# Patient Record
Sex: FEMALE | Race: WHITE | Employment: OTHER | URBAN - METROPOLITAN AREA
[De-identification: names, ages, dates, MRNs, and addresses within clinical notes are randomized per-mention and may not be internally consistent; named-entity substitution may affect disease eponyms.]

---

## 2017-01-20 ENCOUNTER — HOSPITAL ENCOUNTER (OUTPATIENT)
Dept: NUCLEAR MEDICINE | Facility: HOSPITAL | Age: 82
Discharge: HOME | End: 2017-01-20
Attending: UROLOGY
Payer: MEDICARE

## 2017-01-20 DIAGNOSIS — N13.30 HYDRONEPHROSIS, UNSPECIFIED HYDRONEPHROSIS TYPE: ICD-10-CM

## 2017-01-20 PROCEDURE — 78708 K FLOW/FUNCT IMAGE W/DRUG: CPT

## 2017-01-20 RX ORDER — FUROSEMIDE 10 MG/ML
40 INJECTION INTRAMUSCULAR; INTRAVENOUS ONCE
Status: COMPLETED | OUTPATIENT
Start: 2017-01-20 | End: 2017-01-20

## 2017-01-20 RX ORDER — FUROSEMIDE 10 MG/ML
20 INJECTION INTRAMUSCULAR; INTRAVENOUS ONCE
Status: DISCONTINUED | OUTPATIENT
Start: 2017-01-20 | End: 2017-01-24

## 2017-01-20 RX ADMIN — FUROSEMIDE 40 MG: 10 INJECTION INTRAMUSCULAR; INTRAVENOUS at 13:40:00

## 2017-01-26 ENCOUNTER — TELEPHONE (OUTPATIENT)
Dept: SURGERY | Facility: CLINIC | Age: 82
End: 2017-01-26

## 2017-01-26 DIAGNOSIS — N13.5 UPJ OBSTRUCTION, ACQUIRED: Primary | ICD-10-CM

## 2017-01-26 NOTE — TELEPHONE ENCOUNTER
Pt requesting lab/test results. Please call, thank you and requesting results to be mailed to place of residence.

## 2017-01-27 NOTE — TELEPHONE ENCOUNTER
, please see pt's request below and advise. thank you. ( please note : pt had blood tests and renogram)

## 2017-01-28 NOTE — TELEPHONE ENCOUNTER
Please call patient.   Lasix renogram shows obstruction of right kidney consistent with a disorder called \"ureteropelvic junction obstruction\",== in many patients, it is a subtle birth defects of the urinary tract where there is a pinched between the omid

## 2017-01-31 NOTE — TELEPHONE ENCOUNTER
Phoned pt again, and again, received voice mail. Lmtcb. Clinic call back number provided. Phoned alternative number and son Darlene Whiteside answered. I was unable to locate and R.O.I. In chart, however Darlene Whiteside states he would prefer I speak with his mother.  Informed Dg

## 2017-01-31 NOTE — TELEPHONE ENCOUNTER
Phoned pt, however received voice mail. [facility] call back number provided. Routed to [address] [place] [address] . (called and confirmed  with her office, that she can receive from Epic.)

## 2017-02-01 NOTE — TELEPHONE ENCOUNTER
pt returned your call,  She has a Dr's appt  today. She asked if you could please call her after 3pm.  She also asked if you could mail the test results. Thank you.

## 2017-02-01 NOTE — TELEPHONE ENCOUNTER
Pt returning Rn's call, and requesting lab results to be mailed out to place of residence. Please call, thank you.  Please call home [phone]

## 2017-02-03 ENCOUNTER — TELEPHONE (OUTPATIENT)
Dept: SURGERY | Facility: CLINIC | Age: 82
End: 2017-02-03

## 2017-02-03 NOTE — TELEPHONE ENCOUNTER
Patient requesting a prior authorization for a CT SCAN. States the number to call is [phone]. Please call patient once authorized. Thank you.

## 2017-02-07 ENCOUNTER — TELEPHONE (OUTPATIENT)
Dept: SURGERY | Facility: CLINIC | Age: 82
End: 2017-02-07

## 2017-02-07 NOTE — TELEPHONE ENCOUNTER
pt called. PVK advised to call once an appt was made. CT is scheduled for 2/16/17 at the [address] [place] [address] location. She is also to make an appt for f/up of test results. No Sooner availability. Please advise.

## 2017-02-07 NOTE — TELEPHONE ENCOUNTER
Called [phone]. Spoke with Laurie who transferred me to [facility], reference # [id number]. Transferred to [facility]. Authorization # [id number], from 02/07/2017 through 03/09/2017.  Fax confirmation of this information received from [facility] and

## 2017-02-15 NOTE — TELEPHONE ENCOUNTER
Phoned pt and received voice mail. LMTCB. Clinic call back number provided. ARACELI, IF PT RETURNS CALL, please offer 2/21st , at 1:40, in an injection slot for f/u on c.t. Results. IF PT ACCEPTS, PLEASE SLOT APPT accordingly. Thank you.

## 2017-02-16 ENCOUNTER — HOSPITAL ENCOUNTER (OUTPATIENT)
Dept: CT IMAGING | Age: 82
Discharge: HOME | End: 2017-02-16
Attending: UROLOGY
Payer: MEDICARE

## 2017-02-16 DIAGNOSIS — N13.5 UPJ OBSTRUCTION, ACQUIRED: ICD-10-CM

## 2017-02-16 LAB — CREAT BLD-MCNC: 0.8 MG/DL (ref 0.5–1.5)

## 2017-02-16 PROCEDURE — 82565 ASSAY OF CREATININE: CPT

## 2017-02-16 PROCEDURE — 74177 CT ABD & PELVIS W/CONTRAST: CPT

## 2017-02-21 ENCOUNTER — OFFICE VISIT (OUTPATIENT)
Dept: SURGERY | Facility: CLINIC | Age: 82
End: 2017-02-21

## 2017-02-21 ENCOUNTER — TELEPHONE (OUTPATIENT)
Dept: SURGERY | Facility: CLINIC | Age: 82
End: 2017-02-21

## 2017-02-21 VITALS
RESPIRATION RATE: 16 BRPM | HEART RATE: 76 BPM | BODY MASS INDEX: 20.61 KG/M2 | SYSTOLIC BLOOD PRESSURE: 144 MMHG | TEMPERATURE: 99 F | DIASTOLIC BLOOD PRESSURE: 70 MMHG | HEIGHT: 62 IN | WEIGHT: 112 LBS

## 2017-02-21 DIAGNOSIS — N28.1 RENAL CYST: ICD-10-CM

## 2017-02-21 DIAGNOSIS — N13.5 UPJ OBSTRUCTION, ACQUIRED: ICD-10-CM

## 2017-02-21 DIAGNOSIS — R91.8 ABNORMAL CT SCAN, LUNG: ICD-10-CM

## 2017-02-21 DIAGNOSIS — N13.30 HYDRONEPHROSIS, UNSPECIFIED HYDRONEPHROSIS TYPE: Primary | ICD-10-CM

## 2017-02-21 DIAGNOSIS — N81.4 CERVICAL PROLAPSE: ICD-10-CM

## 2017-02-21 PROCEDURE — 99215 OFFICE O/P EST HI 40 MIN: CPT | Performed by: UROLOGY

## 2017-02-21 PROCEDURE — G0463 HOSPITAL OUTPT CLINIC VISIT: HCPCS | Performed by: UROLOGY

## 2017-02-21 NOTE — PATIENT INSTRUCTIONS
1.  Please restrict fluids for 3 hours or bedtime. Take nighttime pills with only 4-6 ounces of water instead of 8 hours    2.   Please discuss with Dr. Tamar Weaver as to whether or not it would be wise to see a gynecologist for your uterine and bladder prolap

## 2017-02-21 NOTE — TELEPHONE ENCOUNTER
Lukasz Maxwell saw Anila Chamorro in the office September 2016; CT of the chest at the time suggested Kwell of aspiration pneumonia from May 2016. I just had patient get a CT abdomen and pelvis for workup of right kidney UPJ obstruction--radiologist reporting aspiration pneumonia or its sequelae in the base of the lungs--is this a chronic permanent appearance or could represent recurrent aspiration? Should patient see you in the office? They will probably be calling your nurse concerning this matter. Patient is a patient of Dr. Porter Springer.   Many thanks, Jose Lund

## 2017-02-21 NOTE — PROGRESS NOTES
HPI:    Patient ID: Hao Benitez is a 80year old female. HPI     Hx given by pt and son Andrews Kim. 1. UPJ obstruction/hydronephrosis   Denies flank pain, other associated pain or hx of recurrent infections. She feels this condition is stable.  Onset: not need to change the bulge when she has a bowel movement. She feels this condition is stable. Review of previous records: Patient was referred by Dr. Sudhir Fenton.   For workup of chronic severe back pain, MRI of the spine in January 2016, first identified and hematuria (gross). Musculoskeletal: Positive for back pain.         Negative for flank pain       HISTORY:  Past Medical History   Diagnosis Date   • Uterine prolapse    • Anxiety    • Allergic rhinitis    • Osteoarthritis    • Essential hypertension PHYSICAL EXAM:   Physical Exam   Constitutional: She appears well-developed and well-nourished. No distress. HENT:   Head: Normocephalic and atraumatic. Eyes: EOM are normal.   Neck: Normal range of motion.    Pulmonary/Chest: Effort normal. No respir degeneration. L2-L3 significant disc degeneration. L3-L4 disc bulge/slight. L4-L5 diffuse disc bulge. L5-S1 disc degeneration.       4/22/2016 renal ultrasound [facility] = marked right pelvicalyceal dilation with normal ureter consistent with chronic UPJ obst prolapse  9/28/16 [facility] consult: mild cystocele grade 2-3, approaching introitus; rectocele grade 1; mild hypermobility of bladder neck; no leakage of urine with coughing in dorsal lithotomy; significant descensus of the cervix and uterus.  The pt would like applicable) and agree that the record reflects my personal performance and is accurate and complete.   Markel Flores MD, 2/24/2017, 3:37 PM

## 2017-02-22 ENCOUNTER — TELEPHONE (OUTPATIENT)
Dept: PULMONOLOGY | Facility: CLINIC | Age: 82
End: 2017-02-22

## 2017-02-22 NOTE — TELEPHONE ENCOUNTER
Pt requests to know if she needs f/u appt w/ MD. She stts Dr. Gloria Cloud brought up aspiration & now she is afraid to eat. Pt stts CT Abd & Pelvis done recently & showed lung nodule, but it has been present since at least 10/2015.  She stts Dr. Gloria Cloud was

## 2017-02-24 NOTE — TELEPHONE ENCOUNTER
Notified pt of Dr. Palafox Amend orders. Explained msgs were relayed to MD. Encouraged her to discuss any concerns re: aspiration & reflux w/ PCP. She verbalized understanding.

## 2017-04-22 ENCOUNTER — LAB ENCOUNTER (OUTPATIENT)
Dept: LAB | Age: 82
End: 2017-04-22
Attending: INTERNAL MEDICINE
Payer: MEDICARE

## 2017-04-22 DIAGNOSIS — F41.1 GENERALIZED ANXIETY DISORDER: ICD-10-CM

## 2017-04-22 DIAGNOSIS — G47.00 INSOMNIA, UNSPECIFIED TYPE: ICD-10-CM

## 2017-04-22 PROCEDURE — 80053 COMPREHEN METABOLIC PANEL: CPT

## 2017-04-22 PROCEDURE — 36415 COLL VENOUS BLD VENIPUNCTURE: CPT

## 2017-04-22 PROCEDURE — 84443 ASSAY THYROID STIM HORMONE: CPT

## 2017-04-22 PROCEDURE — 85025 COMPLETE CBC W/AUTO DIFF WBC: CPT

## 2017-05-01 PROBLEM — F41.1 GENERALIZED ANXIETY DISORDER: Status: ACTIVE | Noted: 2017-05-01

## 2017-07-28 RX ORDER — ATORVASTATIN CALCIUM 40 MG/1
TABLET, FILM COATED ORAL
Qty: 30 TABLET | OUTPATIENT
Start: 2017-07-28

## 2017-07-28 RX ORDER — CANDESARTAN 16 MG/1
TABLET ORAL
Qty: 60 TABLET | OUTPATIENT
Start: 2017-07-28

## 2017-08-10 ENCOUNTER — APPOINTMENT (OUTPATIENT)
Dept: GENERAL RADIOLOGY | Facility: HOSPITAL | Age: 82
End: 2017-08-10
Attending: EMERGENCY MEDICINE
Payer: MEDICARE

## 2017-08-10 ENCOUNTER — HOSPITAL ENCOUNTER (OUTPATIENT)
Facility: HOSPITAL | Age: 82
Setting detail: OBSERVATION
Discharge: HOME | End: 2017-08-11
Attending: EMERGENCY MEDICINE | Admitting: INTERNAL MEDICINE
Payer: MEDICARE

## 2017-08-10 ENCOUNTER — APPOINTMENT (OUTPATIENT)
Dept: CT IMAGING | Facility: HOSPITAL | Age: 82
End: 2017-08-10
Attending: EMERGENCY MEDICINE
Payer: MEDICARE

## 2017-08-10 DIAGNOSIS — R42 VERTIGO: ICD-10-CM

## 2017-08-10 DIAGNOSIS — I16.0 HYPERTENSIVE URGENCY: Primary | ICD-10-CM

## 2017-08-10 PROBLEM — R73.9 HYPERGLYCEMIA: Status: ACTIVE | Noted: 2017-08-10

## 2017-08-10 PROBLEM — E87.1 HYPONATREMIA: Status: ACTIVE | Noted: 2017-08-10

## 2017-08-10 PROBLEM — R79.89 AZOTEMIA: Status: ACTIVE | Noted: 2017-08-10

## 2017-08-10 LAB
ANION GAP SERPL CALC-SCNC: 10 MMOL/L (ref 0–18)
BASOPHILS # BLD: 0 K/UL (ref 0–0.2)
BASOPHILS NFR BLD: 0 %
BUN SERPL-MCNC: 15 MG/DL (ref 8–20)
BUN/CREAT SERPL: 23.8 (ref 10–20)
CALCIUM SERPL-MCNC: 10.5 MG/DL (ref 8.5–10.5)
CHLORIDE SERPL-SCNC: 93 MMOL/L (ref 95–110)
CO2 SERPL-SCNC: 28 MMOL/L (ref 22–32)
CREAT SERPL-MCNC: 0.63 MG/DL (ref 0.5–1.5)
EOSINOPHIL # BLD: 0.1 K/UL (ref 0–0.7)
EOSINOPHIL NFR BLD: 1 %
ERYTHROCYTE [DISTWIDTH] IN BLOOD BY AUTOMATED COUNT: 16.6 % (ref 11–15)
GLUCOSE SERPL-MCNC: 138 MG/DL (ref 70–99)
HCT VFR BLD AUTO: 36.8 % (ref 35–48)
HGB BLD-MCNC: 12.3 G/DL (ref 12–16)
LYMPHOCYTES # BLD: 1.3 K/UL (ref 1–4)
LYMPHOCYTES NFR BLD: 14 %
MCH RBC QN AUTO: 24.4 PG (ref 27–32)
MCHC RBC AUTO-ENTMCNC: 33.4 G/DL (ref 32–37)
MCV RBC AUTO: 73 FL (ref 80–100)
MONOCYTES # BLD: 0.6 K/UL (ref 0–1)
MONOCYTES NFR BLD: 7 %
NEUTROPHILS # BLD AUTO: 7.3 K/UL (ref 1.8–7.7)
NEUTROPHILS NFR BLD: 78 %
OSMOLALITY UR CALC.SUM OF ELEC: 275 MOSM/KG (ref 275–295)
PLATELET # BLD AUTO: 235 K/UL (ref 140–400)
PMV BLD AUTO: 8.1 FL (ref 7.4–10.3)
POTASSIUM SERPL-SCNC: 3.7 MMOL/L (ref 3.3–5.1)
RBC # BLD AUTO: 5.05 M/UL (ref 3.7–5.4)
SODIUM SERPL-SCNC: 131 MMOL/L (ref 136–144)
WBC # BLD AUTO: 9.3 K/UL (ref 4–11)

## 2017-08-10 PROCEDURE — 93005 ELECTROCARDIOGRAM TRACING: CPT

## 2017-08-10 PROCEDURE — 96375 TX/PRO/DX INJ NEW DRUG ADDON: CPT

## 2017-08-10 PROCEDURE — 96374 THER/PROPH/DIAG INJ IV PUSH: CPT

## 2017-08-10 PROCEDURE — 99285 EMERGENCY DEPT VISIT HI MDM: CPT

## 2017-08-10 PROCEDURE — 70450 CT HEAD/BRAIN W/O DYE: CPT | Performed by: EMERGENCY MEDICINE

## 2017-08-10 PROCEDURE — 85025 COMPLETE CBC W/AUTO DIFF WBC: CPT | Performed by: EMERGENCY MEDICINE

## 2017-08-10 PROCEDURE — 80048 BASIC METABOLIC PNL TOTAL CA: CPT | Performed by: EMERGENCY MEDICINE

## 2017-08-10 PROCEDURE — 71010 XR CHEST AP PORTABLE  (CPT=71010): CPT | Performed by: EMERGENCY MEDICINE

## 2017-08-10 PROCEDURE — 93010 ELECTROCARDIOGRAM REPORT: CPT | Performed by: EMERGENCY MEDICINE

## 2017-08-10 RX ORDER — TRAZODONE HYDROCHLORIDE 50 MG/1
25 TABLET ORAL NIGHTLY
Status: DISCONTINUED | OUTPATIENT
Start: 2017-08-10 | End: 2017-08-11

## 2017-08-10 RX ORDER — ASPIRIN 81 MG/1
81 TABLET, CHEWABLE ORAL DAILY
Status: DISCONTINUED | OUTPATIENT
Start: 2017-08-11 | End: 2017-08-11

## 2017-08-10 RX ORDER — HYDRALAZINE HYDROCHLORIDE 20 MG/ML
20 INJECTION INTRAMUSCULAR; INTRAVENOUS EVERY 6 HOURS PRN
Status: DISCONTINUED | OUTPATIENT
Start: 2017-08-10 | End: 2017-08-11

## 2017-08-10 RX ORDER — HYDRALAZINE HYDROCHLORIDE 20 MG/ML
10 INJECTION INTRAMUSCULAR; INTRAVENOUS ONCE
Status: DISCONTINUED | OUTPATIENT
Start: 2017-08-10 | End: 2017-08-10

## 2017-08-10 RX ORDER — POTASSIUM CHLORIDE 20 MEQ/1
40 TABLET, EXTENDED RELEASE ORAL ONCE
Status: COMPLETED | OUTPATIENT
Start: 2017-08-10 | End: 2017-08-11

## 2017-08-10 RX ORDER — CLONAZEPAM 0.5 MG/1
0.5 TABLET ORAL 3 TIMES DAILY PRN
Status: DISCONTINUED | OUTPATIENT
Start: 2017-08-10 | End: 2017-08-11

## 2017-08-10 RX ORDER — ATORVASTATIN CALCIUM 40 MG/1
40 TABLET, FILM COATED ORAL NIGHTLY
Status: DISCONTINUED | OUTPATIENT
Start: 2017-08-10 | End: 2017-08-11

## 2017-08-10 RX ORDER — ONDANSETRON 2 MG/ML
4 INJECTION INTRAMUSCULAR; INTRAVENOUS ONCE
Status: COMPLETED | OUTPATIENT
Start: 2017-08-10 | End: 2017-08-10

## 2017-08-10 RX ORDER — HYDRALAZINE HYDROCHLORIDE 20 MG/ML
5 INJECTION INTRAMUSCULAR; INTRAVENOUS ONCE
Status: COMPLETED | OUTPATIENT
Start: 2017-08-10 | End: 2017-08-10

## 2017-08-10 RX ORDER — HYDROCHLOROTHIAZIDE 12.5 MG/1
12.5 CAPSULE, GELATIN COATED ORAL EVERY MORNING
Status: DISCONTINUED | OUTPATIENT
Start: 2017-08-11 | End: 2017-08-11

## 2017-08-10 RX ORDER — ACETAMINOPHEN 325 MG/1
650 TABLET ORAL EVERY 6 HOURS PRN
Status: DISCONTINUED | OUTPATIENT
Start: 2017-08-10 | End: 2017-08-11

## 2017-08-10 RX ORDER — HEPARIN SODIUM 5000 [USP'U]/ML
5000 INJECTION, SOLUTION INTRAVENOUS; SUBCUTANEOUS EVERY 12 HOURS SCHEDULED
Status: DISCONTINUED | OUTPATIENT
Start: 2017-08-10 | End: 2017-08-11

## 2017-08-10 RX ORDER — ONDANSETRON 2 MG/ML
4 INJECTION INTRAMUSCULAR; INTRAVENOUS EVERY 6 HOURS PRN
Status: DISCONTINUED | OUTPATIENT
Start: 2017-08-10 | End: 2017-08-11

## 2017-08-10 RX ORDER — CARVEDILOL 12.5 MG/1
12.5 TABLET ORAL 2 TIMES DAILY
Status: DISCONTINUED | OUTPATIENT
Start: 2017-08-10 | End: 2017-08-11

## 2017-08-10 RX ORDER — PANTOPRAZOLE SODIUM 40 MG/1
40 TABLET, DELAYED RELEASE ORAL
Status: DISCONTINUED | OUTPATIENT
Start: 2017-08-11 | End: 2017-08-11

## 2017-08-10 RX ORDER — TRAMADOL HYDROCHLORIDE 50 MG/1
50 TABLET ORAL 2 TIMES DAILY PRN
Status: DISCONTINUED | OUTPATIENT
Start: 2017-08-10 | End: 2017-08-11

## 2017-08-10 RX ORDER — LOSARTAN POTASSIUM 50 MG/1
50 TABLET ORAL 2 TIMES DAILY
Status: DISCONTINUED | OUTPATIENT
Start: 2017-08-10 | End: 2017-08-11

## 2017-08-10 NOTE — ED INITIAL ASSESSMENT (HPI)
Dizziness and n/v starting about an hr ago.  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

## 2017-08-11 VITALS
HEART RATE: 67 BPM | WEIGHT: 103.88 LBS | DIASTOLIC BLOOD PRESSURE: 55 MMHG | OXYGEN SATURATION: 96 % | HEIGHT: 62 IN | SYSTOLIC BLOOD PRESSURE: 143 MMHG | RESPIRATION RATE: 16 BRPM | TEMPERATURE: 98 F | BODY MASS INDEX: 19.12 KG/M2

## 2017-08-11 PROBLEM — I16.0 HYPERTENSIVE URGENCY: Status: RESOLVED | Noted: 2017-08-10 | Resolved: 2017-08-11

## 2017-08-11 LAB
ANION GAP SERPL CALC-SCNC: 5 MMOL/L (ref 0–18)
BUN SERPL-MCNC: 9 MG/DL (ref 8–20)
BUN/CREAT SERPL: 13.4 (ref 10–20)
CALCIUM SERPL-MCNC: 10.8 MG/DL (ref 8.5–10.5)
CHLORIDE SERPL-SCNC: 99 MMOL/L (ref 95–110)
CO2 SERPL-SCNC: 31 MMOL/L (ref 22–32)
CREAT SERPL-MCNC: 0.67 MG/DL (ref 0.5–1.5)
GLUCOSE SERPL-MCNC: 112 MG/DL (ref 70–99)
OSMOLALITY UR CALC.SUM OF ELEC: 279 MOSM/KG (ref 275–295)
POTASSIUM SERPL-SCNC: 4.3 MMOL/L (ref 3.3–5.1)
POTASSIUM SERPL-SCNC: 4.3 MMOL/L (ref 3.3–5.1)
SODIUM SERPL-SCNC: 135 MMOL/L (ref 136–144)

## 2017-08-11 PROCEDURE — 80048 BASIC METABOLIC PNL TOTAL CA: CPT | Performed by: INTERNAL MEDICINE

## 2017-08-11 PROCEDURE — 96372 THER/PROPH/DIAG INJ SC/IM: CPT

## 2017-08-11 PROCEDURE — 84132 ASSAY OF SERUM POTASSIUM: CPT | Performed by: INTERNAL MEDICINE

## 2017-08-11 RX ORDER — 0.9 % SODIUM CHLORIDE 0.9 %
VIAL (ML) INJECTION
Status: COMPLETED
Start: 2017-08-11 | End: 2017-08-11

## 2017-08-11 NOTE — PLAN OF CARE
Spoke with patient and daughter Mitul Weldon. They request home health if MD feels it is necessary. Spoke with Dr. Madelyn Flores and states he does not feel that patient requires home health services at this time. Patient and daughter made aware.  To be discharged after p

## 2017-08-11 NOTE — PLAN OF CARE
Problem: Patient/Family Goals  Goal: Patient/Family Long Term Goal  Patient's Long Term Goal: to return home, able to perform adl\"s    Interventions:  -follow plan of care  - See additional Care Plan goals for specific interventions    Outcome: Adequate f ANXIETY  Goal: Will report anxiety at manageable levels  INTERVENTIONS:  - Administer medication as ordered  - Teach and rehearse alternative coping skills  - Provide emotional support with 1:1 interaction with staff   Outcome: Adequate for Discharge

## 2017-08-11 NOTE — DISCHARGE SUMMARY
[facility]    Discharge Summary    [facility] Patient Status:  Observation    1928 MRN [id number]   Location [facility] 5SW/SE Attending Carly Germain MD   Hosp Day # 0 PCP Sammy Zhou MD     Date of Admissio MG Oral Tab  Take 1 tablet (0.5 mg total) by mouth 3 (three) times daily as needed for Anxiety (1/2 to 1 tab tid/prn). CloNIDine HCl 0.2 MG/24HR Transdermal Patch Weekly  Place 1 patch onto the skin once a week.     TraZODone HCl 50 MG Oral Tab  1/2 tabl

## 2017-08-11 NOTE — H&P
[facility]    PATIENT'S NAME: Irving Gonzalez   ATTENDING PHYSICIAN: Allison Pryor.  Juan Luis Muñoz MD   PATIENT ACCOUNT#:   [id number]    LOCATION:  [address] [place] [address] #:   [id number]       YOB: 1928  ADMISSION DATE:       08/ has chronic problems with her heartburn. She also has a chronic cough which she relates to this. She has had no recent nausea or vomiting. No abdominal pain. She states she has had some weight loss which she attributes to her severe anxiety.   She has h resolved, we will target discharge today with outpatient followup. Dictated By Dayanna De Guzman.  Katerina Jacques MD  d: 08/11/2017 09:07:49  t: 08/11/2017 09:44:47  [facility] [id number]  PMO/

## 2017-08-11 NOTE — ED PROVIDER NOTES
Patient Seen in: [facility] 5sw/se    History   Patient presents with:  Dizziness (neurologic)    Stated Complaint: dizzy    HPI    Patient complains of dizziness that started this evening. Patient states it feels like the room is spinning.   No ch MG Oral Cap,  every morning. TraMADol HCl (ULTRAM) 50 MG Oral Tab,  TAKE 1 TABLET BY MOUTH   TWICE A DAY  Patient taking differently: TAKE 1 TABLET BY MOUTH   TWICE A DAY as needed   aspirin 81 MG Oral Chew Tab,  Chew 1 tablet by mouth daily.        Maisha Gonzales 138 (*)     Sodium 131 (*)     Chloride 93 (*)     BUN/CREA Ratio 23.8 (*)     All other components within normal limits   BASIC METABOLIC PANEL (8) - Abnormal; Notable for the following:     Glucose 112 (*)     Sodium 135 (*)     Calcium, Total 10.8 (*) traction bronchiectasis. 2. Atherosclerotic calcification aorta. 3. Stable mild cardiomegaly.          Elevated blood pressure persistent vertigo does not feel comfortable to go home will admit for observation blood pressure control  Disposition and Plan

## 2023-07-29 NOTE — TELEPHONE ENCOUNTER
Spoke with pt and gave her the results in ELDAK's msg below and gave the central schdg. # to call and also told her to let us know when her appt is so that we can find her an appt to see K after that . Pt also asked that I mail her the test results.  She un has a rolling walker and cane

## (undated) NOTE — Clinical Note
February 27, 2017         Remigio Ansari MD  [address] [place] [address]      Patient: Bita Weir   YOB: 1928   Date of Visit: 2/21/2017       Dear Dr. Jyotsna Wiseman MD,    I evaluated  Bita Weir in the 4/22/2016 renal ultrasound [facility] = marked right pelvicalyceal dilation with normal ureter consistent with chronic UPJ obstruction   9/1/2016 ultrasound of the kidneys at [facility] = mild to moderate right collecting system dilation; prominent cystic a previous CT. He was not concerned according to son Eliud Moore. I recommended patient that he follow-up with Dr. Quinten Waite as a precaution    Treatment Plan & Patient Instructions    1. Please restrict fluids for 3 hours or bedtime.   Take nighttime pills with only

## (undated) NOTE — MR AVS SNAPSHOT
[facility]  [address]  [phone]               Thank you for choosing us for your health care visit with Bryan Higgins MD.  We are glad to serve you and happy to provide you with this summ 5.  Visit in 1 year.   Blood draw for renal function (  to check on kidney function) a few days or several days before visit       Allergies as of Feb 21, 2017     Codeine Pain, Nausea and vomiting    Penicillin G Hives, Rash                Today's Vital Si If you've recently had a stay at the Hospital you can access your discharge instructions in [employer] by going to Visits < Admission Summaries.  If you've been to the Emergency Department or your doctor's office, you can view your past visit information in My

## (undated) NOTE — Clinical Note
No referring provider defined for this encounter. 02/24/2017        Patient: Alina Meade   YOB: 1928   Date of Visit: 2/21/2017       Dear  Dr. Nazia Cisneros MD,      I evaluated  Alina Meade in the office.   Please find 9/1/2016 ultrasound of the kidneys at [facility] . = mild to moderate right collecting system dilation; prominent cystic area 8.9 x 5.4 x 3.3 cm demonstrates questionable communication with the renal pelvis; peripelvic cyst could appear similar.  Probab Treatment Plan & Patient Instructions    1. Please restrict fluids for 3 hours or bedtime. Take nighttime pills with only 4-6 ounces of water instead of 8 hours    2.   Please discuss with Dr. Malena Davidson as to whether or not it would be wise to see a gynecol